# Patient Record
Sex: FEMALE | Employment: UNEMPLOYED
[De-identification: names, ages, dates, MRNs, and addresses within clinical notes are randomized per-mention and may not be internally consistent; named-entity substitution may affect disease eponyms.]

---

## 2020-04-02 ENCOUNTER — NURSE TRIAGE (OUTPATIENT)
Dept: OTHER | Facility: CLINIC | Age: 65
End: 2020-04-02

## 2020-04-02 NOTE — TELEPHONE ENCOUNTER
Reason for Disposition   Node is in the neck and causes difficulty breathing    Protocols used: LYMPH NODES - SWOLLEN-ADULT-[place]    Patient called pre-service center [facility]) to schedule new patient appoitment appointment, with red flag complaint, transferred to RN access for triage. Pt is not established at this time with a BS PCP office. She is attempting to get a new patient appointment with the [facility] office. Pt states her lymph nodes on the right side is swollen. She states from her chin into her neck. She states left side is swollen too but right side is worse. She states she had issues with this on March 16 and was given Zpack by prior provider. She states symptoms resolved, but then started swelling 2 days ago, worse this am. No ear pain. No fever. No sore throat. She feels that area is swollen enough that it is causing difficulty breathing. Triage indicates for pt to be seen in the ED. Pt instructed to call back for any new or worsening symptoms. Pt verbalized understanding. She denies any other questions or concerns. Please do not respond to the triage nurse through this encounter. Any subsequent communication should be directly with the patient.

## 2020-04-03 ENCOUNTER — PATIENT OUTREACH (OUTPATIENT)
Dept: FAMILY MEDICINE CLINIC | Age: 65
End: 2020-04-03

## 2020-04-03 NOTE — PROGRESS NOTES
COVID-19 Screening Follow-up Note    Patient contacted regarding COVID-19 risk and screening. Care Transition Nurse/ Ambulatory Care Manager contacted the patient by telephone to perform follow-up assessment. Verified name and  with patient as identifiers. Patient has following risk factors of: Back and joint pain, throat pain      Symptoms reviewed with patient who verbalized the following symptoms: throat pain, back and chest pain, fever. Patient says she has been sick for a long time. Due to no new or worsening symptoms encounter was not routed to provider for escalation. Education provided regarding infection prevention, and signs and symptoms of COVID-19 and when to seek medical attention with patient who verbalized understanding. Discussed exposure protocols and quarantine from [address] you at higher risk for severe illness  and given an opportunity for questions and concerns. The patient agrees to contact the COVID-19 hotline [phone] or PCP office for questions related to their healthcare. CTN/ACM provided contact information for future reference. From CDC: Are you at higher risk for severe illness?  Wash your hands often.  Avoid close contact (6 feet, which is about two arm lengths) with people who are sick.  Put distance between yourself and other people if COVID-19 is spreading in your community.  Clean and disinfect frequently touched surfaces.  Avoid all cruise travel and non-essential air travel.  Call your healthcare professional if you have concerns about COVID-19 and your underlying condition or if you are sick.     For more information on steps you can take to protect yourself, see CDC's How to [place] for follow-up call in 14 days based on severity of symptoms and risk factors

## 2020-04-17 ENCOUNTER — PATIENT OUTREACH (OUTPATIENT)
Dept: FAMILY MEDICINE CLINIC | Age: 65
End: 2020-04-17

## 2020-04-17 NOTE — PROGRESS NOTES
Patient resolved from Transition of Care episode on 4/17/2020  Patient/family has been provided the following resources and education related to COVID-19:                         Signs, symptoms and red flags related to COVID-19            CDC exposure and quarantine guidelines            [facility] exposure contact - [phone]            Contact for their local Department of Health                 Patient currently reports that the following symptoms have improved:  swollen throat, cough     No further outreach scheduled with this CTN/ACM. Episode of Care resolved. Patient has this CTN/ACM contact information if future needs arise.